# Patient Record
Sex: MALE | Race: WHITE | Employment: FULL TIME | URBAN - METROPOLITAN AREA
[De-identification: names, ages, dates, MRNs, and addresses within clinical notes are randomized per-mention and may not be internally consistent; named-entity substitution may affect disease eponyms.]

---

## 2017-02-09 ENCOUNTER — OFFICE VISIT (OUTPATIENT)
Dept: ENDOCRINOLOGY CLINIC | Facility: CLINIC | Age: 47
End: 2017-02-09

## 2017-02-09 ENCOUNTER — TELEPHONE (OUTPATIENT)
Dept: ENDOCRINOLOGY CLINIC | Facility: CLINIC | Age: 47
End: 2017-02-09

## 2017-02-09 VITALS
RESPIRATION RATE: 20 BRPM | WEIGHT: 315 LBS | SYSTOLIC BLOOD PRESSURE: 119 MMHG | HEART RATE: 105 BPM | BODY MASS INDEX: 48 KG/M2 | DIASTOLIC BLOOD PRESSURE: 75 MMHG

## 2017-02-09 DIAGNOSIS — E13.8 DIABETES MELLITUS OF OTHER TYPE WITH COMPLICATION: Primary | ICD-10-CM

## 2017-02-09 LAB
CARTRIDGE LOT#: ABNORMAL NUMERIC
GLUCOSE BLOOD: 205
HEMOGLOBIN A1C: 11.2 % (ref 4.3–5.6)
TEST STRIP LOT #: NORMAL NUMERIC

## 2017-02-09 PROCEDURE — 83036 HEMOGLOBIN GLYCOSYLATED A1C: CPT | Performed by: INTERNAL MEDICINE

## 2017-02-09 PROCEDURE — 36416 COLLJ CAPILLARY BLOOD SPEC: CPT | Performed by: INTERNAL MEDICINE

## 2017-02-09 PROCEDURE — 99214 OFFICE O/P EST MOD 30 MIN: CPT | Performed by: INTERNAL MEDICINE

## 2017-02-09 PROCEDURE — 82962 GLUCOSE BLOOD TEST: CPT | Performed by: INTERNAL MEDICINE

## 2017-02-09 RX ORDER — CANAGLIFLOZIN 300 MG/1
TABLET, FILM COATED ORAL
Refills: 2 | COMMUNITY
Start: 2017-01-16 | End: 2017-03-16

## 2017-02-09 NOTE — PROGRESS NOTES
Name: Melany Leroy  Date: 2/9/2017    Referring Physician: No ref. provider found    HISTORY OF PRESENT ILLNESS   Melany Leroy is a 51year old male who presents for diabetes mellitus, diagnosed with diabetes approx 10 years ago.   He has completed daily., Disp: 30 mL, Rfl: 3  •  PIOGLITAZONE HCL 30 MG Oral Tab, TAKE ONE TABLET BY MOUTH ONCE DAILY, Disp: 90 tablet, Rfl: 0  •  furosemide 20 MG Oral Tab, Take 20 mg by mouth daily. , Disp: , Rfl:   •  Fenofibrate 160 MG Oral Tab, Take 1 tablet (160 mg to History   Diagnosis Date   • Type II or unspecified type diabetes mellitus without mention of complication, not stated as uncontrolled    • Hyperlipemia    • Hypothyroidism        Surgical history:       Past Surgical History    BACK SURGERY      Comment i verbalized understanding of risks and benefits  -Demonstrated U500 pen during visit   -Normotensive  -Foot exam followed by PCP, refused today    This is a 25 minute visit and greater than 50% of the time was spent counseling the patient and/or coordinatin

## 2017-02-09 NOTE — PATIENT INSTRUCTIONS
Continue Invokana and Metformin    Stop Dolphus Plater, actos and glimepiride    Start U500 75 units SQ 3 times daily with meals

## 2017-02-09 NOTE — TELEPHONE ENCOUNTER
Pharmacy wanted to verify directions for Humulin U-500.  Per LOV note Dr. Afia Quezada wrote:    -Kim Force and Metformin  -d/c Tresiba, Glimepiride and Actos  -Start U500 75 units SQ TID with meals, verbalized understanding of risks and benefits    Order

## 2017-02-10 ENCOUNTER — TELEPHONE (OUTPATIENT)
Dept: ENDOCRINOLOGY CLINIC | Facility: CLINIC | Age: 47
End: 2017-02-10

## 2017-02-10 NOTE — TELEPHONE ENCOUNTER
Pharmacy called regarding medication. Please call      Current outpatient prescriptions:     •  Insulin Regular Human, Conc, (HUMULIN R U-500 KWIKPEN) 500 UNIT/ML Subcutaneous Solution Pen-injector, Inject 75 Units into the skin 3 (three) times daily. , Dis

## 2017-02-10 NOTE — TELEPHONE ENCOUNTER
See TE from 2/9/17 dose was already verified with pharamcist. Pharmacist calling again. They would like to double check dose. Dr. Colby Lawrence please confirm dose as 75 units TID with meals U-500 kwikpen. Also, insurance will not pay for less than 1 month supply.

## 2017-03-16 ENCOUNTER — OFFICE VISIT (OUTPATIENT)
Dept: ENDOCRINOLOGY CLINIC | Facility: CLINIC | Age: 47
End: 2017-03-16

## 2017-03-16 VITALS
HEIGHT: 72 IN | HEART RATE: 116 BPM | DIASTOLIC BLOOD PRESSURE: 79 MMHG | TEMPERATURE: 99 F | SYSTOLIC BLOOD PRESSURE: 128 MMHG | WEIGHT: 315 LBS | BODY MASS INDEX: 42.66 KG/M2

## 2017-03-16 DIAGNOSIS — IMO0001 UNCONTROLLED DIABETES MELLITUS TYPE 2 WITHOUT COMPLICATIONS, UNSPECIFIED LONG TERM INSULIN USE STATUS: Primary | ICD-10-CM

## 2017-03-16 LAB
GLUCOSE BLOOD: 221
TEST STRIP LOT #: NORMAL NUMERIC

## 2017-03-16 PROCEDURE — 82962 GLUCOSE BLOOD TEST: CPT | Performed by: INTERNAL MEDICINE

## 2017-03-16 PROCEDURE — 99213 OFFICE O/P EST LOW 20 MIN: CPT | Performed by: INTERNAL MEDICINE

## 2017-03-16 PROCEDURE — 36416 COLLJ CAPILLARY BLOOD SPEC: CPT | Performed by: INTERNAL MEDICINE

## 2017-03-16 RX ORDER — CANAGLIFLOZIN 300 MG/1
300 TABLET, FILM COATED ORAL DAILY
Qty: 30 TABLET | Refills: 6 | Status: SHIPPED | OUTPATIENT
Start: 2017-03-16

## 2017-03-16 NOTE — PROGRESS NOTES
Name: Drew Hilliard  Date: 3/16/2017    Referring Physician: No ref. provider found    HISTORY OF PRESENT ILLNESS   Drew Hilliard is a 51year old male who presents for diabetes mellitus, diagnosed with diabetes approx 10 years ago.   He has completed daily, Disp: 300 each, Rfl: 3  •  MetFORMIN HCl 500 MG Oral Tab, TAKE TWO TABLETS BY MOUTH TWICE DAILY IN THE MORNING IN THE EVENING WITH  MEALS, Disp: 360 tablet, Rfl: 1  •  furosemide 20 MG Oral Tab, Take 20 mg by mouth daily. , Disp: , Rfl:   •  Fenofibr Diagnosis Date   • Type II or unspecified type diabetes mellitus without mention of complication, not stated as uncontrolled    • Hyperlipemia    • Hypothyroidism        Surgical history:       Past Surgical History    BACK SURGERY      Comment injury af refused     RTC 2 months    3/16/2017  Gallo Mason MD

## 2017-03-31 ENCOUNTER — TELEPHONE (OUTPATIENT)
Dept: ENDOCRINOLOGY CLINIC | Facility: CLINIC | Age: 47
End: 2017-03-31

## 2017-03-31 NOTE — TELEPHONE ENCOUNTER
Returned call to patient. He states he saw [facility] in February and dose of U500 was increased. He is now running out of prescription early as we did not send updated prescription with new dosing instructions.  Confirmed per [facility] LOV note from 3/16 patient's dose i

## 2017-04-18 ENCOUNTER — OFFICE VISIT (OUTPATIENT)
Dept: INTERNAL MEDICINE CLINIC | Facility: CLINIC | Age: 47
End: 2017-04-18

## 2017-04-18 VITALS
BODY MASS INDEX: 42.66 KG/M2 | WEIGHT: 315 LBS | DIASTOLIC BLOOD PRESSURE: 82 MMHG | HEART RATE: 98 BPM | HEIGHT: 72 IN | SYSTOLIC BLOOD PRESSURE: 133 MMHG

## 2017-04-18 DIAGNOSIS — I10 ESSENTIAL HYPERTENSION: ICD-10-CM

## 2017-04-18 DIAGNOSIS — M96.1 FAILED BACK SYNDROME: Primary | ICD-10-CM

## 2017-04-18 PROBLEM — E66.01 MORBID OBESITY (HCC): Status: ACTIVE | Noted: 2017-04-18

## 2017-04-18 PROBLEM — E78.5 DYSLIPIDEMIA ASSOCIATED WITH TYPE 2 DIABETES MELLITUS (HCC): Status: ACTIVE | Noted: 2017-04-18

## 2017-04-18 PROBLEM — E11.69 DYSLIPIDEMIA ASSOCIATED WITH TYPE 2 DIABETES MELLITUS (HCC): Status: ACTIVE | Noted: 2017-04-18

## 2017-04-18 PROBLEM — N20.0 RECURRENT KIDNEY STONES: Status: ACTIVE | Noted: 2017-04-18

## 2017-04-18 PROBLEM — E03.9 HYPOTHYROIDISM: Status: ACTIVE | Noted: 2017-04-18

## 2017-04-18 PROCEDURE — 99212 OFFICE O/P EST SF 10 MIN: CPT | Performed by: INTERNAL MEDICINE

## 2017-04-18 PROCEDURE — 99214 OFFICE O/P EST MOD 30 MIN: CPT | Performed by: INTERNAL MEDICINE

## 2017-04-18 RX ORDER — METHOCARBAMOL 750 MG/1
750 TABLET, FILM COATED ORAL 2 TIMES DAILY
COMMUNITY
End: 2017-06-27

## 2017-04-18 RX ORDER — LORATADINE 10 MG/1
10 TABLET ORAL DAILY
COMMUNITY

## 2017-04-18 RX ORDER — OXYCODONE AND ACETAMINOPHEN 10; 325 MG/1; MG/1
TABLET ORAL
COMMUNITY
End: 2017-06-27

## 2017-04-18 RX ORDER — LEVOTHYROXINE SODIUM 0.15 MG/1
150 TABLET ORAL DAILY
Qty: 90 TABLET | Refills: 0 | Status: SHIPPED | OUTPATIENT
Start: 2017-04-18 | End: 2017-06-27

## 2017-04-18 RX ORDER — LISINOPRIL 10 MG/1
10 TABLET ORAL 2 TIMES DAILY
Qty: 180 TABLET | Refills: 0 | Status: SHIPPED | OUTPATIENT
Start: 2017-04-18 | End: 2017-06-27

## 2017-04-18 RX ORDER — FENOFIBRATE 160 MG/1
160 TABLET ORAL DAILY
Qty: 90 TABLET | Refills: 0 | Status: SHIPPED | OUTPATIENT
Start: 2017-04-18 | End: 2017-06-27

## 2017-04-18 RX ORDER — IBUPROFEN 200 MG
TABLET ORAL
COMMUNITY

## 2017-04-18 RX ORDER — FUROSEMIDE 20 MG/1
20 TABLET ORAL DAILY
Qty: 90 TABLET | Refills: 0 | Status: SHIPPED | OUTPATIENT
Start: 2017-04-18 | End: 2017-06-27

## 2017-04-18 RX ORDER — PROPRANOLOL/HYDROCHLOROTHIAZID 40 MG-25MG
1 TABLET ORAL DAILY
COMMUNITY

## 2017-04-18 RX ORDER — ZOLPIDEM TARTRATE 12.5 MG/1
12.5 TABLET, FILM COATED, EXTENDED RELEASE ORAL DAILY
COMMUNITY

## 2017-04-18 RX ORDER — LOVASTATIN 20 MG/1
20 TABLET ORAL DAILY
Qty: 90 TABLET | Refills: 0 | Status: SHIPPED | OUTPATIENT
Start: 2017-04-18 | End: 2017-06-27

## 2017-04-18 RX ORDER — LOPERAMIDE HYDROCHLORIDE 2 MG/1
4 TABLET ORAL DAILY
COMMUNITY

## 2017-04-18 NOTE — PATIENT INSTRUCTIONS
Please continue your current medications for now. Please schedule an appointment with Pain Management. Please schedule a physical with labs soon.

## 2017-04-18 NOTE — H&P
Juline Bamberger is a 51year old male who presents to establish ongoing primary care. HPI:   Previous PCP was Dr. Mandi Crowell, who retired last year. He has failed back syndrome and has undergone 3 spine surgeries as listed below.   He previously was follow daily. Disp:  Rfl:    methocarbamol 750 MG Oral Tab Take 750 mg by mouth 2 (two) times daily. Disp:  Rfl:    Turmeric 500 MG Oral Cap Take 1 capsule by mouth daily. Disp:  Rfl:    loratadine 10 MG Oral Tab Take 10 mg by mouth daily.  Disp:  Rfl:    ibuprofe day. Disp: 100 each Rfl: 3     No Known Allergies   Past Medical History   Diagnosis Date   • Type II or unspecified type diabetes mellitus without mention of complication, not stated as uncontrolled    • Essential hypertension    • Hypothyroidism    • Dys labs.  - Pain Management Referral - [place] Location    2.  Essential hypertension  Well-controlled      Noé Shahid MD  4/18/2017  11:21 AM

## 2017-05-19 ENCOUNTER — OFFICE VISIT (OUTPATIENT)
Dept: ENDOCRINOLOGY CLINIC | Facility: CLINIC | Age: 47
End: 2017-05-19

## 2017-05-19 VITALS
DIASTOLIC BLOOD PRESSURE: 80 MMHG | BODY MASS INDEX: 42.66 KG/M2 | WEIGHT: 315 LBS | HEART RATE: 98 BPM | HEIGHT: 72 IN | SYSTOLIC BLOOD PRESSURE: 124 MMHG

## 2017-05-19 DIAGNOSIS — Z79.4 UNCONTROLLED TYPE 2 DIABETES MELLITUS WITH HYPERGLYCEMIA, WITH LONG-TERM CURRENT USE OF INSULIN (HCC): Primary | ICD-10-CM

## 2017-05-19 DIAGNOSIS — E11.65 UNCONTROLLED TYPE 2 DIABETES MELLITUS WITH HYPERGLYCEMIA, WITH LONG-TERM CURRENT USE OF INSULIN (HCC): Primary | ICD-10-CM

## 2017-05-19 PROCEDURE — 99213 OFFICE O/P EST LOW 20 MIN: CPT | Performed by: INTERNAL MEDICINE

## 2017-05-19 PROCEDURE — 83036 HEMOGLOBIN GLYCOSYLATED A1C: CPT | Performed by: INTERNAL MEDICINE

## 2017-05-19 PROCEDURE — 82962 GLUCOSE BLOOD TEST: CPT | Performed by: INTERNAL MEDICINE

## 2017-05-19 PROCEDURE — 36416 COLLJ CAPILLARY BLOOD SPEC: CPT | Performed by: INTERNAL MEDICINE

## 2017-05-19 NOTE — PATIENT INSTRUCTIONS
Increase U500 to 90 units SQ 3 times per day    Dr. Omega Mccormick at [facility]  Dr. Eleazar Hussein or Dr. Sharlet Boxer [phone]

## 2017-05-19 NOTE — PROGRESS NOTES
Name: Fidelina Lovell  Date: 5/19/2017    Referring Physician: No ref. provider found    HISTORY OF PRESENT ILLNESS   Fidelina Lovell is a 51year old male who presents for diabetes mellitus, diagnosed with diabetes approx 10 years ago.   He has completed loratadine 10 MG Oral Tab, Take 10 mg by mouth daily. , Disp: , Rfl:   •  ibuprofen 200 MG Oral Tab, Take 4 tablets by mouth three times a day, Disp: , Rfl:   •  Loperamide HCl (IMODIUM A-D) 2 MG Oral Tab, Take 4 mg by mouth daily. , Disp: , Rfl:   •  Levoth mouth daily, Disp: , Rfl:      Allergies:   No Known Allergies    Social History:   Social History    Marital Status: Single              Spouse Name:                       Years of Education:                 Number of children:               Occupational thyroid nodules or cervical lymphadenopathy  Respiratory:  clear to auscultation bilaterally  Cardiovascular:  regular rate, rhythm, , no murmurs, S3 or S4  Gastrointestinal:  normal bowel sounds and no palpable masses in abdomen, organomegaly or tendernes

## 2017-06-27 ENCOUNTER — TELEPHONE (OUTPATIENT)
Dept: INTERNAL MEDICINE CLINIC | Facility: CLINIC | Age: 47
End: 2017-06-27

## 2017-06-27 ENCOUNTER — OFFICE VISIT (OUTPATIENT)
Dept: INTERNAL MEDICINE CLINIC | Facility: CLINIC | Age: 47
End: 2017-06-27

## 2017-06-27 VITALS
HEART RATE: 100 BPM | SYSTOLIC BLOOD PRESSURE: 116 MMHG | HEIGHT: 72 IN | BODY MASS INDEX: 42.66 KG/M2 | WEIGHT: 315 LBS | DIASTOLIC BLOOD PRESSURE: 77 MMHG

## 2017-06-27 DIAGNOSIS — I10 ESSENTIAL HYPERTENSION WITH GOAL BLOOD PRESSURE LESS THAN 130/85: ICD-10-CM

## 2017-06-27 DIAGNOSIS — E11.9 TYPE 2 DIABETES MELLITUS WITHOUT COMPLICATION, WITH LONG-TERM CURRENT USE OF INSULIN (HCC): Primary | ICD-10-CM

## 2017-06-27 DIAGNOSIS — Z79.4 TYPE 2 DIABETES MELLITUS WITHOUT COMPLICATION, WITH LONG-TERM CURRENT USE OF INSULIN (HCC): Primary | ICD-10-CM

## 2017-06-27 DIAGNOSIS — E66.01 MORBID OBESITY DUE TO EXCESS CALORIES (HCC): ICD-10-CM

## 2017-06-27 DIAGNOSIS — M96.1 FAILED BACK SYNDROME OF LUMBAR SPINE: ICD-10-CM

## 2017-06-27 PROCEDURE — 99215 OFFICE O/P EST HI 40 MIN: CPT | Performed by: INTERNAL MEDICINE

## 2017-06-27 PROCEDURE — 99212 OFFICE O/P EST SF 10 MIN: CPT | Performed by: INTERNAL MEDICINE

## 2017-06-27 RX ORDER — FENOFIBRATE 160 MG/1
160 TABLET ORAL DAILY
Qty: 90 TABLET | Refills: 1 | Status: SHIPPED | OUTPATIENT
Start: 2017-06-27

## 2017-06-27 RX ORDER — LEVOTHYROXINE SODIUM 0.15 MG/1
150 TABLET ORAL DAILY
Qty: 90 TABLET | Refills: 1 | Status: SHIPPED | OUTPATIENT
Start: 2017-06-27

## 2017-06-27 RX ORDER — FUROSEMIDE 20 MG/1
20 TABLET ORAL DAILY
Qty: 90 TABLET | Refills: 1 | Status: SHIPPED | OUTPATIENT
Start: 2017-06-27

## 2017-06-27 RX ORDER — OXYCODONE AND ACETAMINOPHEN 10; 325 MG/1; MG/1
TABLET ORAL
Qty: 90 TABLET | Refills: 0 | Status: SHIPPED | OUTPATIENT
Start: 2017-06-27

## 2017-06-27 RX ORDER — OXYCODONE AND ACETAMINOPHEN 10; 325 MG/1; MG/1
1 TABLET ORAL EVERY 8 HOURS PRN
Qty: 90 TABLET | Refills: 0 | Status: SHIPPED | OUTPATIENT
Start: 2017-06-27 | End: 2017-06-27

## 2017-06-27 RX ORDER — LISINOPRIL 10 MG/1
10 TABLET ORAL 2 TIMES DAILY
Qty: 180 TABLET | Refills: 1 | Status: SHIPPED | OUTPATIENT
Start: 2017-06-27

## 2017-06-27 RX ORDER — LOVASTATIN 20 MG/1
20 TABLET ORAL DAILY
Qty: 90 TABLET | Refills: 1 | Status: SHIPPED | OUTPATIENT
Start: 2017-06-27

## 2017-06-27 NOTE — PROGRESS NOTES
Jm Balderrama is a 51year old male. HPI:   1. Type 2 diabetes mellitus without complication, with long-term [facility] use of [facility]    The patient has been taking all prescribed diabetic medications at home and has been following a diabetic diet. Rfl:    loratadine 10 MG Oral Tab Take 10 mg by mouth daily. Disp:  Rfl:    ibuprofen 200 MG Oral Tab Take 4 tablets by mouth three times a day Disp:  Rfl:    Loperamide HCl (IMODIUM A-D) 2 MG Oral Tab Take 4 mg by mouth daily.  Disp:  Rfl:    Levothyroxine status: Never Smoker                                                              Smokeless tobacco: Never Used                      Alcohol use: Yes           0.0 oz/week     Comment: Occasional beer       REVIEW OF SYSTEMS:   GENERAL HEALTH: appetite too Discussed weight reduction diet and the need to exercise routinely at least 3 times per week. This can be started with walking regularly and gradually increase the distance or time walked. A healthy BMI is considered 25 or less.      - BARIATRICS - INTERNAL

## 2017-06-27 NOTE — TELEPHONE ENCOUNTER
Cristian Capone is calling to have the medication listed below changed. Was given a 45 day quantity but can only give a 30 day. Wants to either get the change of quantity or directions changed. Please advise.    oxyCODONE-acetaminophen  MG Oral Tab Take 1-2 tabl

## 2017-06-27 NOTE — TELEPHONE ENCOUNTER
I have a patient at the pharmacy now trying to get his oxycodone. Patient is Yoni Claudio.  1970 - He was given # 80  and pharmacy can only dispense a 30 day supply. Either the quantity or the directions need to be changed.  The Pharmacy is call

## 2017-06-28 RX ORDER — OXYCODONE AND ACETAMINOPHEN 10; 325 MG/1; MG/1
TABLET ORAL
Qty: 60 TABLET | Refills: 0 | Status: CANCELLED | OUTPATIENT
Start: 2017-06-28

## 2017-06-28 NOTE — TELEPHONE ENCOUNTER
Patient called and stated checking status on medication question    Requesting a call back from nurse-

## 2017-06-28 NOTE — TELEPHONE ENCOUNTER
Patient came into the [facility] dept to ask that the doctor address his messages regarding the rx he is to decrease from 90 pills. Will wait in the waiting area for a response as he leaves for out of town tomorrow.  Thank you

## 2017-06-28 NOTE — TELEPHONE ENCOUNTER
Patient is calling to inquire on his script again. He states his understanding is that we just need to call to give the verbal auth to change the quantity on the script.  The change would be an adjustment from qty 90 tabs to qty of 60 tabs (30 day supply is

## 2017-06-28 NOTE — TELEPHONE ENCOUNTER
Pharmacy cannot dispense as written. Needs written for a 30 day supply with a quantity of 60. Medication not dispensed to pt. PJV please advise, new rx pended for signature. Pt leaving out of town 6/29, needs script expedited.

## 2017-06-29 NOTE — TELEPHONE ENCOUNTER
Manuel Jordan at [address] indicated that the oxycodone was taken care of yesterday and patient picked up the medication.

## 2017-07-12 RX ORDER — INSULIN HUMAN 500 [IU]/ML
INJECTION, SOLUTION SUBCUTANEOUS
Qty: 18 ML | Refills: 2 | Status: SHIPPED | OUTPATIENT
Start: 2017-07-12

## 2018-12-06 RX ORDER — INSULIN HUMAN 500 [IU]/ML
INJECTION, SOLUTION SUBCUTANEOUS
Refills: 2 | OUTPATIENT
Start: 2018-12-06

## 2018-12-06 NOTE — TELEPHONE ENCOUNTER
LOV 5/2017. Called patient and he confirmed he has been seeing another Endocrinologist due to insurance change who is now prescribing his insulin.

## (undated) NOTE — MR AVS SNAPSHOT
[facility]  [address] [place] [address] [phone]               Thank you for choosing us for your health care visit with Yoel German MD.  We are glad to serve you and happy to provide you with this summary of your visit.   Ple * Insulin Pen Needle 32G X 4 MM Misc   Inject once daily   Commonly known as:  BD PEN NEEDLE NICOLÁS U/F           * Insulin Pen Needle 32G X 4 MM Misc   Inject 3 times daily   Commonly known as:  BD PEN NEEDLE NICOLÁS U/F           Insulin Regular Human (Conc) Visit SummuS Render  You can access your MyChart to more actively manage your health care and view more details from this visit by going to [email or web address].   If you've recently had a stay at the Hospital you can access your discharge instructions i

## (undated) NOTE — MR AVS SNAPSHOT
[facility]  [address] [place] [address] [phone]               Thank you for choosing us for your health care visit with Delonte Pruitt MD.  We are glad to serve you and happy to provide you with this summary of your visit.   Ple Take 1 tablet (160 mg total) by mouth daily. furosemide 20 MG Tabs   Take 1 tablet (20 mg total) by mouth daily.    Commonly known as:  LASIX           * Glucose Blood Strp   Check 3 times daily   Commonly known as:  RELION CONFIRM/MICRO TEST by Does not apply route. Turmeric 500 MG Caps   Take 1 capsule by mouth daily. * Notice: This list has 4 medication(s) that are the same as other medications prescribed for you.  Read the directions carefully, and ask your doctor or oth

## (undated) NOTE — MR AVS SNAPSHOT
[facility]  [address] [place] [address] [phone]               Thank you for choosing us for your health care visit with Chago Salgado MD.  We are glad to serve you and happy to provide you with this summary of your visit.   Ple What changed: You were already taking a medication with the same name, and this prescription was added. Make sure you understand how and when to take each.    Commonly known as:  BD PEN NEEDLE NICOLÁS U/F           Insulin Regular Human (Conc) 500 UNIT/ML Sop GLUCOSE BLOOD TEST      Component Value Standard Range & Units    GLUCOSE 205     Test Strip Lot # [id number] Numeric    Test Strip Expiration Date 12/31/17 Date                HEMOGLOBIN A1C      Component Value Standard Range & Units    HEMOGLOBIN A1C 11.2 HOW TO GET STARTED: HOW TO STAY MOTIVATED:   Start activities slowly and build up over time Do what you like   Get your heart pumping – brisk walking, biking, swimming Even 10 minute increments are effective and add up over the week   2 ½ hours per week –

## (undated) NOTE — LETTER
12/13/17        Woody Boston  [address]  [address] [address]      Dear Blanquita Price,    [facility] records indicate that you have outstanding lab work and or testing that was ordered for you and has not yet been completed:          CBC W Differential W Platelet